# Patient Record
Sex: FEMALE | Race: BLACK OR AFRICAN AMERICAN | NOT HISPANIC OR LATINO | Employment: FULL TIME | URBAN - METROPOLITAN AREA
[De-identification: names, ages, dates, MRNs, and addresses within clinical notes are randomized per-mention and may not be internally consistent; named-entity substitution may affect disease eponyms.]

---

## 2023-11-12 ENCOUNTER — HOSPITAL ENCOUNTER (EMERGENCY)
Facility: HOSPITAL | Age: 53
Discharge: HOME | End: 2023-11-12

## 2023-11-12 ENCOUNTER — APPOINTMENT (OUTPATIENT)
Dept: RADIOLOGY | Facility: HOSPITAL | Age: 53
End: 2023-11-12

## 2023-11-12 VITALS
TEMPERATURE: 98 F | OXYGEN SATURATION: 100 % | DIASTOLIC BLOOD PRESSURE: 95 MMHG | RESPIRATION RATE: 16 BRPM | BODY MASS INDEX: 32.2 KG/M2 | WEIGHT: 175 LBS | HEIGHT: 62 IN | SYSTOLIC BLOOD PRESSURE: 137 MMHG | HEART RATE: 87 BPM

## 2023-11-12 DIAGNOSIS — M84.375A METATARSAL STRESS FRACTURE, LEFT, INITIAL ENCOUNTER: Primary | ICD-10-CM

## 2023-11-12 PROCEDURE — 73630 X-RAY EXAM OF FOOT: CPT | Mod: LT,FY

## 2023-11-12 PROCEDURE — 99285 EMERGENCY DEPT VISIT HI MDM: CPT | Mod: 25

## 2023-11-12 PROCEDURE — 2500000001 HC RX 250 WO HCPCS SELF ADMINISTERED DRUGS (ALT 637 FOR MEDICARE OP): Performed by: NURSE PRACTITIONER

## 2023-11-12 PROCEDURE — 73610 X-RAY EXAM OF ANKLE: CPT | Mod: LT,FY

## 2023-11-12 PROCEDURE — 73610 X-RAY EXAM OF ANKLE: CPT | Mod: LEFT SIDE | Performed by: RADIOLOGY

## 2023-11-12 PROCEDURE — 99284 EMERGENCY DEPT VISIT MOD MDM: CPT | Mod: 25

## 2023-11-12 PROCEDURE — 73630 X-RAY EXAM OF FOOT: CPT | Mod: LEFT SIDE | Performed by: RADIOLOGY

## 2023-11-12 RX ORDER — TIZANIDINE 2 MG/1
4 TABLET ORAL 3 TIMES DAILY PRN
Qty: 180 TABLET | Refills: 0 | Status: SHIPPED | OUTPATIENT
Start: 2023-11-12 | End: 2023-12-12

## 2023-11-12 RX ORDER — NAPROXEN 500 MG/1
500 TABLET ORAL ONCE
Status: COMPLETED | OUTPATIENT
Start: 2023-11-12 | End: 2023-11-12

## 2023-11-12 RX ORDER — NAPROXEN 500 MG/1
500 TABLET ORAL
Qty: 20 TABLET | Refills: 0 | Status: SHIPPED | OUTPATIENT
Start: 2023-11-12 | End: 2023-11-22

## 2023-11-12 RX ADMIN — NAPROXEN 500 MG: 500 TABLET ORAL at 13:25

## 2023-11-12 ASSESSMENT — COLUMBIA-SUICIDE SEVERITY RATING SCALE - C-SSRS
6. HAVE YOU EVER DONE ANYTHING, STARTED TO DO ANYTHING, OR PREPARED TO DO ANYTHING TO END YOUR LIFE?: NO
2. HAVE YOU ACTUALLY HAD ANY THOUGHTS OF KILLING YOURSELF?: NO
1. IN THE PAST MONTH, HAVE YOU WISHED YOU WERE DEAD OR WISHED YOU COULD GO TO SLEEP AND NOT WAKE UP?: NO

## 2023-11-12 ASSESSMENT — PAIN - FUNCTIONAL ASSESSMENT: PAIN_FUNCTIONAL_ASSESSMENT: 0-10

## 2023-11-12 ASSESSMENT — PAIN SCALES - GENERAL: PAINLEVEL_OUTOF10: 8

## 2023-11-12 ASSESSMENT — PAIN DESCRIPTION - ORIENTATION: ORIENTATION: LEFT

## 2023-11-12 ASSESSMENT — PAIN DESCRIPTION - LOCATION: LOCATION: ANKLE

## 2023-11-12 NOTE — ED PROVIDER NOTES
HPI   Chief Complaint   Patient presents with    Ankle Pain       53-year-old female twisted her ankle yesterday evening and she is now experiencing 9 out of 10 lateral ankle pain.  She also endorses swelling.  She denies change in skin temperature or color.  She denies paresthesia.  She denies knee or hip pain.  She denies actually falling.  She denies chest pain or dyspnea.  She denies abdominal pain, nausea or vomiting.  Her only allergy is latex.  She has no medication allergies.  Vital signs were stable in triage.  Pain worsens when she ambulates.      History provided by:  Patient   used: No                        [place] Coma Scale Score: 15                  Patient History   No past medical history on file.  No past surgical history on file.  No family history on file.  Social History     Tobacco Use    Smoking status: Not on file    Smokeless tobacco: Not on file   Substance Use Topics    Alcohol use: Not on file    Drug use: Not on file       Physical Exam   ED Triage Vitals [11/12/23 1318]   Temp Heart Rate Resp BP   36.7 °C (98 °F) 87 16 (!) 137/95      SpO2 Temp src Heart Rate Source Patient Position   100 % -- -- --      BP Location FiO2 (%)     -- --       Physical Exam  Constitutional:       Appearance: Normal appearance.   HENT:      Head: Normocephalic and atraumatic.      Nose: Nose normal.      Mouth/Throat:      Mouth: Mucous membranes are moist.   Cardiovascular:      Rate and Rhythm: Normal rate and regular rhythm.      Pulses: Normal pulses.      Heart sounds: Normal heart sounds.   Pulmonary:      Effort: Pulmonary effort is normal.      Breath sounds: Normal breath sounds.   Abdominal:      General: Abdomen is flat.      Palpations: Abdomen is soft.   Musculoskeletal:         General: Swelling and tenderness present.      Cervical back: Normal range of motion and neck supple.      Comments: Left ankle swelling with tenderness positive [place].  Pedal and posterior tibial  pulse 2/2   Skin:     General: Skin is dry.      Capillary Refill: Capillary refill takes less than 2 seconds.   Neurological:      General: No focal deficit present.      Mental Status: She is alert and oriented to person, place, and time.   Psychiatric:         Mood and Affect: Mood normal.         Behavior: Behavior normal.         ED Course & MDM   Diagnoses as of 11/12/23 1441   Metatarsal stress fracture, left, initial encounter       Medical Decision Making  X-ray ordered of left ankle and he received Naprosyn, Ace wrap, and crutches.  X-ray confirmed 1/5 metatarsal fracture.  Patient was placed in a boot.  I requested appointment with podiatry for follow-up.  She received crutches she understands rest ice compression elevation.  I sent Naprosyn and tizanidine to her pharmacy of choice.  Return precautions reviewed.  Pedal and posterior tibial pulse 2/2.  Safely discharged home.    Amount and/or Complexity of Data Reviewed  Radiology: ordered and independent interpretation performed.     Details: Fifth metatarsal fracture        Procedure  Procedures     Deven Leonard, APRN-CNP  11/12/23 5078

## 2023-11-12 NOTE — ED TRIAGE NOTES
Patient ambulatory to ED with complaint of L ankle pain after tripping in her bedroom yesterday and rolling her ankle. Denies hitting head or use of blood thinners.

## 2024-04-20 ENCOUNTER — HOSPITAL ENCOUNTER (EMERGENCY)
Facility: HOSPITAL | Age: 54
Discharge: HOME | End: 2024-04-20

## 2024-04-20 VITALS
DIASTOLIC BLOOD PRESSURE: 75 MMHG | OXYGEN SATURATION: 98 % | WEIGHT: 175 LBS | BODY MASS INDEX: 32.2 KG/M2 | TEMPERATURE: 97.9 F | RESPIRATION RATE: 16 BRPM | HEART RATE: 72 BPM | HEIGHT: 62 IN | SYSTOLIC BLOOD PRESSURE: 127 MMHG

## 2024-04-20 DIAGNOSIS — K08.89 PAIN, DENTAL: Primary | ICD-10-CM

## 2024-04-20 PROCEDURE — 99283 EMERGENCY DEPT VISIT LOW MDM: CPT

## 2024-04-20 PROCEDURE — 2500000004 HC RX 250 GENERAL PHARMACY W/ HCPCS (ALT 636 FOR OP/ED)

## 2024-04-20 PROCEDURE — 96372 THER/PROPH/DIAG INJ SC/IM: CPT

## 2024-04-20 PROCEDURE — 2500000001 HC RX 250 WO HCPCS SELF ADMINISTERED DRUGS (ALT 637 FOR MEDICARE OP)

## 2024-04-20 RX ORDER — NAPROXEN 500 MG/1
500 TABLET ORAL EVERY 12 HOURS
Qty: 6 TABLET | Refills: 0 | Status: SHIPPED | OUTPATIENT
Start: 2024-04-20 | End: 2024-04-23

## 2024-04-20 RX ORDER — AMOXICILLIN AND CLAVULANATE POTASSIUM 875; 125 MG/1; MG/1
1 TABLET, FILM COATED ORAL EVERY 12 HOURS
Qty: 14 TABLET | Refills: 0 | Status: SHIPPED | OUTPATIENT
Start: 2024-04-20 | End: 2024-04-27

## 2024-04-20 RX ORDER — ACETAMINOPHEN 500 MG
1000 TABLET ORAL EVERY 8 HOURS PRN
Qty: 18 TABLET | Refills: 0 | Status: SHIPPED | OUTPATIENT
Start: 2024-04-20 | End: 2024-04-23

## 2024-04-20 RX ORDER — ACETAMINOPHEN 325 MG/1
975 TABLET ORAL ONCE
Status: COMPLETED | OUTPATIENT
Start: 2024-04-20 | End: 2024-04-20

## 2024-04-20 RX ORDER — KETOROLAC TROMETHAMINE 30 MG/ML
15 INJECTION, SOLUTION INTRAMUSCULAR; INTRAVENOUS ONCE
Status: COMPLETED | OUTPATIENT
Start: 2024-04-20 | End: 2024-04-20

## 2024-04-20 RX ORDER — AMOXICILLIN AND CLAVULANATE POTASSIUM 875; 125 MG/1; MG/1
1 TABLET, FILM COATED ORAL ONCE
Status: COMPLETED | OUTPATIENT
Start: 2024-04-20 | End: 2024-04-20

## 2024-04-20 RX ADMIN — AMOXICILLIN AND CLAVULANATE POTASSIUM 1 TABLET: 875; 125 TABLET, FILM COATED ORAL at 08:47

## 2024-04-20 RX ADMIN — KETOROLAC TROMETHAMINE 15 MG: 30 INJECTION, SOLUTION INTRAMUSCULAR at 08:48

## 2024-04-20 RX ADMIN — ACETAMINOPHEN 975 MG: 325 TABLET ORAL at 08:47

## 2024-04-20 ASSESSMENT — PAIN SCALES - GENERAL
PAINLEVEL_OUTOF10: 10 - WORST POSSIBLE PAIN
PAINLEVEL_OUTOF10: 10 - WORST POSSIBLE PAIN

## 2024-04-20 ASSESSMENT — COLUMBIA-SUICIDE SEVERITY RATING SCALE - C-SSRS
2. HAVE YOU ACTUALLY HAD ANY THOUGHTS OF KILLING YOURSELF?: NO
1. IN THE PAST MONTH, HAVE YOU WISHED YOU WERE DEAD OR WISHED YOU COULD GO TO SLEEP AND NOT WAKE UP?: NO
6. HAVE YOU EVER DONE ANYTHING, STARTED TO DO ANYTHING, OR PREPARED TO DO ANYTHING TO END YOUR LIFE?: NO

## 2024-04-20 ASSESSMENT — PAIN DESCRIPTION - LOCATION
LOCATION: OTHER (COMMENT)
LOCATION: OTHER (COMMENT)

## 2024-04-20 ASSESSMENT — PAIN - FUNCTIONAL ASSESSMENT: PAIN_FUNCTIONAL_ASSESSMENT: 0-10

## 2024-04-20 NOTE — Clinical Note
Andrey Louis was seen and treated in our emergency department on 4/20/2024.  She may return to work on 04/21/2024.       If you have any questions or concerns, please don't hesitate to call.      Ravin Turner PA-C

## 2024-04-20 NOTE — ED PROVIDER NOTES
HPI   Chief Complaint   Patient presents with    Dental Pain       54-year-old female with no significant past medical history presents the ED today with a chief concern of dental pain.  Patient states symptoms have been present for 1 week now.  She states she does have an appoint with her dentist in 4 days.  She states that she did get a dental infection last year, but states that she was given penicillin and it resolved.  She says that she was told that she needed to get her tooth fixed, but she lost her job so she has not been able to get that done since.  She states that she has been taking Motrin and Tylenol for her symptoms as needed.  Describes the pain as an aching/throbbing located in the right lower tooth.  She states that the tooth is cracked in that area.  She denies any history of diabetes.  Denies any chest pain or shortness of breath.  Denies any neck, arm, or jaw pain.  Denies any lightheadedness, dizziness, or imbalance.  States that she does just have a little bit of pain around the site.  She denies exacerbating or relieving factors.  Denies fever/chills, nausea/vomiting.  Denies diarrhea or hematochezia.  Denies abdominal pain.  She is currently going through menopause.  Denies any chance of pregnancy.  Has had irregular menstrual cycles for about a year now.  Denies any difficulty swallowing or sore throat.  Denies lip or tongue swelling.  She has no other symptoms or concerns at this time.  She did not take any medicines before she came to the ED today.      History provided by:  Patient   used: No                        No data recorded                   Patient History   No past medical history on file.  No past surgical history on file.  No family history on file.  Social History     Tobacco Use    Smoking status: Not on file    Smokeless tobacco: Not on file   Substance Use Topics    Alcohol use: Not on file    Drug use: Not on file       Physical Exam   ED Triage Vitals  [04/20/24 0810]   Temperature Heart Rate Respirations BP   36.6 °C (97.9 °F) 71 19 127/75      Pulse Ox Temp Source Heart Rate Source Patient Position   98 % Temporal -- --      BP Location FiO2 (%)     Left arm --       Physical Exam  Gen.: Vitals noted no distress afebrile. Normal phonation, no stridor, no trismus. Patient is handling secretions well without any tripod positioning or drooling.  ENT: TMs clear bilaterally, EACs unremarkable. Mastoids nontender. Posterior oropharynx without erythema, exudate, or swelling. Uvula is in the midline and nonedematous. No Reid's Angina.  Diffuse dental caries.  Tooth #30 and 31 are cracked.  No subgingival abscess.  No necrotizing gingivitis 54-year-old female with no significant past medical history presents the ED today with a chief concern of dental pain.  Vital signs reassuring.  Patient overall appears well and is nontoxic-appearing.  She is full range of motion of the neck without any meningismus.  Satting well on room air.  Not hypoxic.  Not tachycardic.  Afebrile.  Neck: Supple. No meningismus through full range of motion. No lymphadenopathy.   Cardiac: Regular rate rhythm no murmur.   Lungs: Good aeration throughout. No adventitious breath sounds.   Abdomen: Soft nontender nonsurgical throughout  Extremities: No peripheral edema. extremities are moist with good skin turgor.  Skin: No rash.   Neuro: No focal neurologic deficits. cranial nerves II-XII grossly intact.  5/5 strength in lower and upper extremities bilaterally.  Sensation intact in upper and lower extremities bilaterally.  Coordination intact.    ED Course & MDM   Diagnoses as of 04/20/24 0836   Pain, dental       Medical Decision Making  54-year-old female with no significant past medical history presents the ED today with a chief concern of dental pain.  Vital signs reassuring.  Patient overall appears well and is nontoxic-appearing.  She has full range of motion of the neck without any  meningismus.  Satting well on room air.  Not hypoxic.  Not tachycardic.  Afebrile.  Patient does not have any significant facial swelling on exam.  No signs of airway compromise.  No swelling of the floor the mouth.  No lymphadenopathy.  There is no Reid's angina.  No systemic signs or symptoms.  She is not immune compromised.  She is not diabetic.  Do not think CT or blood work is indicated at this time.  Will save patient the radiation given only a mild amount of swelling.  She does have an appoint with her dentist coming up.  She was given Toradol, Tylenol, and Augmentin in the ED.  Again full range of motion of the neck without any meningismus.  No signs of meningitis.  No signs of systemic illness.  Again overall appears well.  She is denying any chest pain or shortness of breath.  No lightheadedness, dizziness, or syncope.  I have low suspicion for any cardiac or etiology other than dental causing her pain.  Not concern for any deep space infection at this time.  No trismus.  Will treat outpatient with naproxen, Tylenol, and Augmentin.  Discussed use of these medications.  Discussed possible side effects of these medications.  Discussed impression and findings with patient she feels comfortable returning home.  We discussed very strict return precautions including returning for any new or worsening signs or symptoms.  Patient is in agreement this plan.  She will follow-up with the PCP and dentistry within 3 days.  Again discussed strict return precautions.  Referred to dentistry and PCP.  Discussed with patient that if swelling begins to get worse she should come back to the ED immediately for CT scan    Differential diagnosis: Pulpitis, dental caries, dental abscess, necrotizing gingivitis, Reid's angina, periodontitis    Disposition/treatment  1.  Dental pain    Shared decision-making was used patient feels comfortable returning home     Patient was advised to follow up with recommended provider in 1  day1 for another evaluation and exam. I advised patient/guardian to return or go to closest emergency room immediately if symptoms change, get worse, new symptoms develop prior to follow up. If there is no improvement in symptoms in the next 24 hours they are advised to return for further evaluation and exam. I also explained the plan and treatment course. Patient/guardian is in agreement with plan, treatment course, and follow up and states verbally that they will comply.    Homegoing. I discussed the differential; results and discharge plan with the patient and/or family/friend/caregiver if present.  I emphasized the importance of follow-up with the physician I referred them to in the timeframe recommended.  I explained reasons for the patient to return to the Emergency Department. They agreed that if they feel their condition is worsening or if they have any other concern they should call 911 immediately for further assistance. I gave the patient an opportunity to ask all questions they had and answered all of them accordingly. They understand return precautions and discharge instructions. The patient and/or family/friend/caregiver expressed understanding verbally and that they would comply.        This note has been transcribed using voice recognition and may contain grammatical errors, misplaced words, incorrect words, incorrect phrases or other errors.          Procedure  Procedures     Ravin Turner PA-C  04/20/24 0857

## 2024-04-20 NOTE — DISCHARGE INSTRUCTIONS
Please return to the ED immediately if you have any new or worsening signs or symptoms  Please follow-up with your primary care doctor and dentistry within 3 days  Please take Augmentin tonight at 9 AM.  Please take naproxen tonight at 9 PM.

## 2024-04-20 NOTE — ED TRIAGE NOTES
Pt. States she been having a toothache for a week she is unable to get a dentist appointment until 04/25. States the pain is unbearable.

## 2024-04-23 ENCOUNTER — APPOINTMENT (OUTPATIENT)
Dept: PRIMARY CARE | Facility: HOSPITAL | Age: 54
End: 2024-04-23

## 2024-05-29 ENCOUNTER — HOSPITAL ENCOUNTER (EMERGENCY)
Facility: HOSPITAL | Age: 54
Discharge: HOME | End: 2024-05-29

## 2024-05-29 VITALS
WEIGHT: 175 LBS | DIASTOLIC BLOOD PRESSURE: 74 MMHG | HEIGHT: 63 IN | TEMPERATURE: 97.3 F | RESPIRATION RATE: 16 BRPM | OXYGEN SATURATION: 97 % | SYSTOLIC BLOOD PRESSURE: 164 MMHG | BODY MASS INDEX: 31.01 KG/M2 | HEART RATE: 65 BPM

## 2024-05-29 DIAGNOSIS — H65.193 ACUTE EFFUSION OF BOTH MIDDLE EARS: Primary | ICD-10-CM

## 2024-05-29 DIAGNOSIS — J01.10 ACUTE NON-RECURRENT FRONTAL SINUSITIS: ICD-10-CM

## 2024-05-29 PROCEDURE — 99283 EMERGENCY DEPT VISIT LOW MDM: CPT

## 2024-05-29 RX ORDER — LORATADINE 10 MG/1
10 TABLET ORAL DAILY
Qty: 20 TABLET | Refills: 0 | Status: SHIPPED | OUTPATIENT
Start: 2024-05-29 | End: 2024-06-18

## 2024-05-29 RX ORDER — PSEUDOEPHEDRINE HCL 30 MG
30 TABLET ORAL EVERY 4 HOURS PRN
Qty: 30 TABLET | Refills: 0 | Status: SHIPPED | OUTPATIENT
Start: 2024-05-29 | End: 2024-06-08

## 2024-05-29 RX ORDER — AZITHROMYCIN 250 MG/1
250 TABLET, FILM COATED ORAL DAILY
Qty: 6 TABLET | Refills: 0 | Status: SHIPPED | OUTPATIENT
Start: 2024-05-29 | End: 2024-06-04

## 2024-05-29 RX ORDER — FLUTICASONE PROPIONATE 50 MCG
1 SPRAY, SUSPENSION (ML) NASAL DAILY PRN
Qty: 16 G | Refills: 0 | Status: SHIPPED | OUTPATIENT
Start: 2024-05-29 | End: 2024-06-28

## 2024-05-29 ASSESSMENT — PAIN SCALES - GENERAL: PAINLEVEL_OUTOF10: 5 - MODERATE PAIN

## 2024-05-29 ASSESSMENT — PAIN DESCRIPTION - DESCRIPTORS: DESCRIPTORS: ACHING

## 2024-05-29 ASSESSMENT — PAIN DESCRIPTION - ORIENTATION: ORIENTATION: LEFT

## 2024-05-29 ASSESSMENT — PAIN DESCRIPTION - LOCATION: LOCATION: EAR

## 2024-05-29 ASSESSMENT — PAIN DESCRIPTION - FREQUENCY: FREQUENCY: CONSTANT/CONTINUOUS

## 2024-05-29 ASSESSMENT — PAIN DESCRIPTION - PAIN TYPE: TYPE: ACUTE PAIN

## 2024-05-29 ASSESSMENT — PAIN - FUNCTIONAL ASSESSMENT: PAIN_FUNCTIONAL_ASSESSMENT: 0-10

## 2024-05-30 NOTE — ED PROVIDER NOTES
HPI     CC: Earache (Pt c/o bilateral earache and sinus drainage that started on Sunday. Pt denies fever, n/v/d)     HPI: Andrey Louis is a 54 y.o. female with no past medical history presents with concern for continued nasal congestion and sensation of hearing underwater out of both ears.  Patient reports that this started about Saturday and has continued.  She woke up this morning and suddenly felt that she could not hear, left ear being worse than right ear.  She states that it sounds like Roland Guillory's teachers around her.  She works as a  and is having difficulty hearing when on the phone.  She does believe that this has been allergies as she has not felt sick, had a fever or chills, or any change in her sinus congestion.  She has tried cold medicines and sinus congestion medicine over-the-counter which has not helped.  Has not tried any sinus medications such as Claritin at this time.  She has used Claritin in the past with relief.  Denies chest pain, shortness of breath, fevers, chills, or change in nasal drainage.    ROS: 10-point review of systems was performed and is otherwise negative except as noted in HPI.      Past Medical History: Noncontributory except per HPI     Past Surgical History: Noncontributory except per HPI     Family History: Reviewed and noncontributory     Social History:  Noncontributory except per HPI       Allergies   Allergen Reactions    Latex Unknown     irritation       Home Meds:   Current Outpatient Medications   Medication Instructions    azithromycin (ZITHROMAX Z-YULISSA) 250 mg, oral, Daily, Take 2 tablets by mouth on day 1 and 1 (one) tablet by mouth for the next 4 days.    fluticasone (Flonase) 50 mcg/actuation nasal spray 1 spray, Each Nostril, Daily PRN, Shake gently. Before first use, prime pump. After use, clean tip and replace cap.    loratadine (CLARITIN) 10 mg, oral, Daily    pseudoephedrine (SUDAFED) 30 mg, oral, Every 4 hours PRN    tiZANidine  "(ZANAFLEX) 4 mg, oral, 3 times daily PRN        ED Triage Vitals [05/29/24 1928]   Temperature Heart Rate Respirations BP   36.3 °C (97.3 °F) 65 16 164/74      Pulse Ox Temp Source Heart Rate Source Patient Position   97 % Temporal Monitor --      BP Location FiO2 (%)     -- --         Heart Rate:  [65]   Temperature:  [36.3 °C (97.3 °F)]   Respirations:  [16]   BP: (164)/(74)   Height:  [160 cm (5' 3\")]   Weight:  [79.4 kg (175 lb)]   Pulse Ox:  [97 %]      Physical Exam:  Physical Exam  Vitals and nursing note reviewed.   Constitutional:       General: She is not in acute distress.     Appearance: Normal appearance. She is not ill-appearing.   HENT:      Head: Normocephalic and atraumatic.      Right Ear: External ear normal. No tenderness. A middle ear effusion is present. Tympanic membrane is not erythematous or retracted.      Left Ear: External ear normal. No tenderness. A middle ear effusion is present. Tympanic membrane is not erythematous or retracted.      Nose: Congestion and rhinorrhea present.      Mouth/Throat:      Mouth: Mucous membranes are moist.      Pharynx: Oropharynx is clear. Uvula midline. No pharyngeal swelling or oropharyngeal exudate.      Tonsils: No tonsillar exudate or tonsillar abscesses.   Eyes:      Extraocular Movements: Extraocular movements intact.      Conjunctiva/sclera: Conjunctivae normal.      Pupils: Pupils are equal, round, and reactive to light.   Cardiovascular:      Rate and Rhythm: Normal rate and regular rhythm.      Pulses: Normal pulses.   Pulmonary:      Effort: Pulmonary effort is normal. No respiratory distress.      Breath sounds: Normal breath sounds.   Abdominal:      General: Abdomen is flat.      Palpations: Abdomen is soft.      Tenderness: There is no abdominal tenderness.   Musculoskeletal:         General: Normal range of motion.      Cervical back: Normal range of motion and neck supple.   Skin:     General: Skin is warm and dry.      Capillary Refill: " Capillary refill takes less than 2 seconds.   Neurological:      General: No focal deficit present.      Mental Status: She is alert and oriented to person, place, and time.   Psychiatric:         Mood and Affect: Mood normal.          Diagnostic Results        Labs Reviewed - No data to display      No orders to display                 [place] Coma Scale Score: 15                  Procedure  Procedures    ED Course & MDM   Assessment/Plan:     Medications - No data to display     Diagnoses as of 05/29/24 2053   Acute effusion of both middle ears   Acute non-recurrent frontal sinusitis       Medical Decision Making    nAdrey Louis is a 54 y.o. female with no past medical history presents with concern for continued nasal congestion and sensation of hearing underwater out of both ears.  Patient is nontoxic-appearing and vital signs are normal.  Physical exam is concerning for bilateral middle ear effusions and sinusitis consistent with seasonal allergies/sinusitis.  This has been ongoing since about Saturday for the patient.  Do not feel that she needs testing for COVID or flu at this time given that symptoms her clinical.  We discussed that she should try different medications such as Flonase, Claritin, and Sudafed which were all sent to her pharmacy.  We discussed that if her symptoms do not improve within a few days, she can start the antibiotic that was also sent to her pharmacy.  Right now, the antibiotic would likely not help as much as the other medications, so I did encourage her to try those first.  If she does develop fever, chills, or change in symptoms that are concerning to her, she may return to the emergency department.  Patient agreeable to plan of care and felt comfortable returning home.    Disposition: Home    ED Prescriptions       Medication Sig Dispense Start Date End Date Auth. Provider    azithromycin (Zithromax Z-Neto) 250 mg tablet Take 1 tablet (250 mg) by mouth once daily for 6 days. Take 2  tablets by mouth on day 1 and 1 (one) tablet by mouth for the next 4 days. 6 tablet 5/29/2024 6/4/2024 Diane Quinteros PA-C    fluticasone (Flonase) 50 mcg/actuation nasal spray Administer 1 spray into each nostril once daily as needed for rhinitis. Shake gently. Before first use, prime pump. After use, clean tip and replace cap. 16 g 5/29/2024 6/28/2024 Diane Quinteros PA-C    loratadine (Claritin) 10 mg tablet Take 1 tablet (10 mg) by mouth once daily for 20 days. 20 tablet 5/29/2024 6/18/2024 Diane F ROCAEL Quinteros    pseudoephedrine (Sudafed) 30 mg tablet Take 1 tablet (30 mg) by mouth every 4 hours if needed for congestion for up to 10 days. 30 tablet 5/29/2024 6/8/2024 Diane Quinteros PA-C            Social Determinants Affecting Care: none     Diane Quinteros PA-C    This note was dictated by speech recognition. Minor errors in transcription may be present.     Diane Quinteros PA-C  05/29/24 2053

## 2024-07-10 ENCOUNTER — TELEPHONE (OUTPATIENT)
Dept: ORTHOPEDIC SURGERY | Facility: HOSPITAL | Age: 54
End: 2024-07-10

## 2024-07-10 NOTE — LETTER
To Whom It May Concern:       Andrey Louis has been a patient of Dr. Remi Hadley of [facility] since August 2022. This letter is to verify that they have a medical grade metal implant within their body due to a surgery that Dr. Hadley performed.     If there are any further concerns, please call the office at [phone].       Sincerely,       The Office of Dr. Remi Hadley  [facility]  Department of Orthopaedic Surgery  Divisions of Orthopaedic Trauma and   Adult Reconstruction

## 2024-07-10 NOTE — TELEPHONE ENCOUNTER
Patient is requesting a letter for the airline for travel saying she has plates in her arm.     Requesting to receive it via email at [email or web address]    Letter complete.     Jackelin Grubbs LPN

## 2024-08-13 ENCOUNTER — HOSPITAL ENCOUNTER (EMERGENCY)
Facility: HOSPITAL | Age: 54
Discharge: HOME | End: 2024-08-13

## 2024-08-13 VITALS
RESPIRATION RATE: 16 BRPM | BODY MASS INDEX: 31.89 KG/M2 | HEIGHT: 63 IN | HEART RATE: 66 BPM | SYSTOLIC BLOOD PRESSURE: 168 MMHG | OXYGEN SATURATION: 100 % | WEIGHT: 180 LBS | DIASTOLIC BLOOD PRESSURE: 81 MMHG | TEMPERATURE: 97.8 F

## 2024-08-13 DIAGNOSIS — M62.830 LUMBAR PARASPINAL MUSCLE SPASM: Primary | ICD-10-CM

## 2024-08-13 LAB
APPEARANCE UR: CLEAR
BILIRUB UR STRIP.AUTO-MCNC: NEGATIVE MG/DL
COLOR UR: YELLOW
GLUCOSE UR STRIP.AUTO-MCNC: NORMAL MG/DL
HCG UR QL IA.RAPID: NEGATIVE
KETONES UR STRIP.AUTO-MCNC: NEGATIVE MG/DL
LEUKOCYTE ESTERASE UR QL STRIP.AUTO: NEGATIVE
MUCOUS THREADS #/AREA URNS AUTO: NORMAL /LPF
NITRITE UR QL STRIP.AUTO: NEGATIVE
PH UR STRIP.AUTO: 5.5 [PH]
PROT UR STRIP.AUTO-MCNC: NEGATIVE MG/DL
RBC # UR STRIP.AUTO: ABNORMAL /UL
RBC #/AREA URNS AUTO: NORMAL /HPF
SP GR UR STRIP.AUTO: 1.02
SQUAMOUS #/AREA URNS AUTO: NORMAL /HPF
UROBILINOGEN UR STRIP.AUTO-MCNC: NORMAL MG/DL
WBC #/AREA URNS AUTO: NORMAL /HPF

## 2024-08-13 PROCEDURE — 81025 URINE PREGNANCY TEST: CPT | Performed by: PHYSICIAN ASSISTANT

## 2024-08-13 PROCEDURE — 81001 URINALYSIS AUTO W/SCOPE: CPT | Performed by: PHYSICIAN ASSISTANT

## 2024-08-13 PROCEDURE — 99283 EMERGENCY DEPT VISIT LOW MDM: CPT

## 2024-08-13 PROCEDURE — 2500000004 HC RX 250 GENERAL PHARMACY W/ HCPCS (ALT 636 FOR OP/ED): Performed by: PHYSICIAN ASSISTANT

## 2024-08-13 PROCEDURE — 96372 THER/PROPH/DIAG INJ SC/IM: CPT | Performed by: PHYSICIAN ASSISTANT

## 2024-08-13 RX ORDER — KETOROLAC TROMETHAMINE 30 MG/ML
60 INJECTION, SOLUTION INTRAMUSCULAR; INTRAVENOUS ONCE
Status: COMPLETED | OUTPATIENT
Start: 2024-08-13 | End: 2024-08-13

## 2024-08-13 RX ORDER — METHOCARBAMOL 500 MG/1
500 TABLET, FILM COATED ORAL EVERY 12 HOURS PRN
Qty: 20 TABLET | Refills: 0 | Status: SHIPPED | OUTPATIENT
Start: 2024-08-13

## 2024-08-13 RX ORDER — DIAZEPAM 5 MG/ML
5 INJECTION, SOLUTION INTRAMUSCULAR; INTRAVENOUS ONCE
Status: COMPLETED | OUTPATIENT
Start: 2024-08-13 | End: 2024-08-13

## 2024-08-13 ASSESSMENT — PAIN DESCRIPTION - LOCATION: LOCATION: BACK

## 2024-08-13 ASSESSMENT — PAIN SCALES - GENERAL
PAINLEVEL_OUTOF10: 8
PAINLEVEL_OUTOF10: 6

## 2024-08-13 ASSESSMENT — PAIN DESCRIPTION - ORIENTATION: ORIENTATION: LOWER;RIGHT

## 2024-08-13 ASSESSMENT — PAIN - FUNCTIONAL ASSESSMENT
PAIN_FUNCTIONAL_ASSESSMENT: 0-10
PAIN_FUNCTIONAL_ASSESSMENT: 0-10

## 2024-08-13 NOTE — ED TRIAGE NOTES
PT is A/Ox4 coming in for lower right sided back pain that started Sunday. PT stated that she woke up with a spasm in the back and tried over the counter medication without relief.

## 2024-08-13 NOTE — Clinical Note
Andrey Louis was seen and treated in our emergency department on 8/13/2024.  She may return to work on 08/14/2024.       If you have any questions or concerns, please don't hesitate to call.      Manuel Baca PA-C

## 2024-08-13 NOTE — ED PROVIDER NOTES
HPI   Chief Complaint   Patient presents with    Back Pain       History of present illness: 54-year-old female complains of right lower back pain that began 3 days ago.  Patient states she was out with friends the night before and woke up with stiffness and discomfort in the right lower back.  Some of that shoots into the right buttocks.  It is disc comfortable to sit or lie down.  Feels better with standing.  Denies paresthesias incontinence abdominal pain nausea vomiting fevers chills sweats lightheadedness dizziness abdominal pain chest pain shortness of breath.  Tried Tylenol ibuprofen hot cold limited improvement.    Review of systems: Constitutional, eye, ENT, cardiovascular, respiratory, gastrointestinal, genitourinary, neurologic, musculoskeletal, dermatologic, hematologic, endocrine systems were evaluated and were negative unless otherwise specified in history of present illness.    Medications: Reviewed and per nursing note.    Family history: Denies relevant medical conditions.    Past medical history: None per patient    Social history: Denies tobacco, alcohol, drug use.    Physical exam:    Appearance: Well-developed, well-nourished, nontoxic-appearing, alert and oriented x3. Talking in complete sentences.    HEENT:  Head normocephalic atraumatic, extraocular movements intact, pupils equal round reactive to light, mucous membranes are moist and pink.    NECK:  Nml Inspection, no meningismus, no thyromegaly, no lymphadenopathy, no JVD, trachea is midline.    Respiratory: Clear to auscultation bilaterally with normal bilateral excursion. No wheezes, rhonchi, crackles.    Cardiovascular: Regular rate and rhythm, no murmurs, rubs or gallops. Pulses 2+ symmetrically in the dorsalis pedis and radial pulses.    Abdomen/GI:  Soft, nontender, nondistended, normal bowel sounds x4. No masses or organomegaly.    : Right CVA tenderness that extends to the lumbar region    Neuro:  Oriented x 3, Speech Clear,  cranial nerves grossly intact. Normal sensation to light touch in all 4 extremities.    Musculoskeletal: Right paralumbar tenderness and spasm with no vertebral point tenderness.  Patient spontaneously moves all 4 extremities.    Skin:  No cyanosis, clubbing, edema, open wounds, or rashes.                Patient History   History reviewed. No pertinent past medical history.  History reviewed. No pertinent surgical history.  No family history on file.  Social History     Tobacco Use    Smoking status: Not on file    Smokeless tobacco: Not on file   Substance Use Topics    Alcohol use: Not on file    Drug use: Not on file       Physical Exam   ED Triage Vitals [08/13/24 1050]   Temperature Heart Rate Respirations BP   36.6 °C (97.8 °F) 66 16 168/81      Pulse Ox Temp src Heart Rate Source Patient Position   100 % -- -- --      BP Location FiO2 (%)     -- --       Physical Exam      ED Course & MDM   Diagnoses as of 08/13/24 1203   Lumbar paraspinal muscle spasm                 No data recorded     Imelda Coma Scale Score: 15 (08/13/24 1131 : Carmen Castillo LPN)                           Medical Decision Making  Labs Reviewed  URINALYSIS WITH REFLEX CULTURE AND MICROSCOPIC - Abnormal     Color, Urine                  Yellow                 Appearance, Urine             Clear                  Specific Gravity, Urine       1.022                  pH, Urine                     5.5                    Protein, Urine                NEGATIVE                Glucose, Urine                Normal                 Blood, Urine                    (*)                  Ketones, Urine                NEGATIVE                Bilirubin, Urine              NEGATIVE                Urobilinogen, Urine           Normal                 Nitrite, Urine                NEGATIVE                Leukocyte Esterase, Urine     NEGATIVE             HCG, URINE, QUALITATIVE - Normal     HCG, Urine                    NEGATIVE             URINALYSIS WITH  REFLEX CULTURE AND MICROSCOPIC         Narrative: The following orders were created for panel order Urinalysis with Reflex Culture and Microscopic.                  Procedure                               Abnormality         Status                                     ---------                               -----------         ------                                     Urinalysis with Reflex C...[[id number]]  Abnormal            Final result                               Extra Urine Gray Tube[[id number]]                                                                                         Please view results for these tests on the individual orders.  EXTRA URINE GRAY TUBE  URINALYSIS MICROSCOPIC WITH REFLEX CULTURE      54-year-old female with pain right lower back.  Differential diagnosis of fracture dislocation sprain strain pyelonephritis kidney stone muscle spasm sciatica herniated disc spinal stenosis cauda equina epidural abscess.  Examination shows tenderness and spasm right paralumbar region including the right CVA region.  No urinary symptoms.  No vertebral point tenderness.    Urinalysis hCG ordered.  The studies Show trace blood in the macroscopic but no microhematuria.  Unlikely kidney stone.  Patient had improvement with Valium and Toradol IM.  Presentation most consistent with a muscle spasm.  Patient is given prescription for muscle relaxer.    Patient will be discharged to home with prescription.  Patient is educated in signs and symptoms of worsening symptoms and reasons to come back to the emergency department.  Will need to follow up with primary care provider.  Patient does not report social determinants of health impacting ability to obtain care that is needed.  Patient agrees with plan.    This is a transcription.  Text was reviewed for errors, but some transcription errors may remain.  Please call for any questions.          Procedure  Procedures     Manuel Baca PA-C  08/13/24 [id number]

## 2024-11-18 ENCOUNTER — HOSPITAL ENCOUNTER (EMERGENCY)
Facility: HOSPITAL | Age: 54
Discharge: HOME | End: 2024-11-18

## 2024-11-18 VITALS
TEMPERATURE: 96.8 F | RESPIRATION RATE: 18 BRPM | BODY MASS INDEX: 30.83 KG/M2 | WEIGHT: 174 LBS | HEIGHT: 63 IN | SYSTOLIC BLOOD PRESSURE: 174 MMHG | OXYGEN SATURATION: 99 % | HEART RATE: 74 BPM | DIASTOLIC BLOOD PRESSURE: 70 MMHG

## 2024-11-18 DIAGNOSIS — K04.7 DENTAL INFECTION: Primary | ICD-10-CM

## 2024-11-18 PROCEDURE — 2500000002 HC RX 250 W HCPCS SELF ADMINISTERED DRUGS (ALT 637 FOR MEDICARE OP, ALT 636 FOR OP/ED): Performed by: NURSE PRACTITIONER

## 2024-11-18 PROCEDURE — 2500000001 HC RX 250 WO HCPCS SELF ADMINISTERED DRUGS (ALT 637 FOR MEDICARE OP): Performed by: NURSE PRACTITIONER

## 2024-11-18 PROCEDURE — 99283 EMERGENCY DEPT VISIT LOW MDM: CPT

## 2024-11-18 PROCEDURE — 2500000005 HC RX 250 GENERAL PHARMACY W/O HCPCS: Performed by: NURSE PRACTITIONER

## 2024-11-18 RX ORDER — PENICILLIN V POTASSIUM 250 MG/1
500 TABLET, FILM COATED ORAL ONCE
Status: COMPLETED | OUTPATIENT
Start: 2024-11-18 | End: 2024-11-18

## 2024-11-18 RX ORDER — PENICILLIN V POTASSIUM 500 MG/1
500 TABLET, FILM COATED ORAL 4 TIMES DAILY
Qty: 28 TABLET | Refills: 0 | Status: SHIPPED | OUTPATIENT
Start: 2024-11-18 | End: 2024-11-25

## 2024-11-18 RX ORDER — NAPROXEN 500 MG/1
500 TABLET ORAL
Qty: 20 TABLET | Refills: 0 | Status: SHIPPED | OUTPATIENT
Start: 2024-11-18 | End: 2024-11-28

## 2024-11-18 RX ORDER — NAPROXEN 500 MG/1
500 TABLET ORAL ONCE
Status: COMPLETED | OUTPATIENT
Start: 2024-11-18 | End: 2024-11-18

## 2024-11-18 ASSESSMENT — PAIN DESCRIPTION - PROGRESSION: CLINICAL_PROGRESSION: NOT CHANGED

## 2024-11-18 ASSESSMENT — PAIN DESCRIPTION - LOCATION: LOCATION: TEETH

## 2024-11-18 ASSESSMENT — PAIN DESCRIPTION - DESCRIPTORS: DESCRIPTORS: SORE

## 2024-11-18 ASSESSMENT — PAIN - FUNCTIONAL ASSESSMENT: PAIN_FUNCTIONAL_ASSESSMENT: 0-10

## 2024-11-18 ASSESSMENT — PAIN DESCRIPTION - FREQUENCY: FREQUENCY: INTERMITTENT

## 2024-11-18 ASSESSMENT — PAIN DESCRIPTION - ONSET: ONSET: PROGRESSIVE

## 2024-11-18 ASSESSMENT — PAIN DESCRIPTION - PAIN TYPE: TYPE: ACUTE PAIN

## 2024-11-18 ASSESSMENT — PAIN SCALES - GENERAL: PAINLEVEL_OUTOF10: 9

## 2024-11-18 ASSESSMENT — PAIN DESCRIPTION - ORIENTATION: ORIENTATION: RIGHT

## 2024-11-18 NOTE — ED TRIAGE NOTES
Pt states that she has been dealing with tooth pain on the right side for a couple months now. Pt states that she is unable to get into the dentist's office until mid December. Pt has swelling on the right side of her face, pain rated 9/10.

## 2024-11-18 NOTE — ED PROVIDER NOTES
HPI   Chief Complaint   Patient presents with    Dental Pain    Oral Swelling       54-year-old female presents today with dental infection and swelling of the right jaw.  She had this exact same symptoms occur a few months ago and she came to our emergency department and the infection went down.  She has a dental appointment on December 17 which is the soonest she could get in.  She rates her pain 8 out of 10.  She denies dyspnea.  She denies fever or chills.  She denies chest pain.  She denies abdominal pain, nausea, or vomiting.  She is postmenopausal and all vital signs are stable in triage.      History provided by:  Patient   used: No            Patient History   No past medical history on file.  No past surgical history on file.  No family history on file.  Social History     Tobacco Use    Smoking status: Not on file    Smokeless tobacco: Not on file   Substance Use Topics    Alcohol use: Not on file    Drug use: Not on file       Physical Exam   ED Triage Vitals [11/18/24 1006]   Temperature Heart Rate Respirations BP   36 °C (96.8 °F) 74 18 174/70      Pulse Ox Temp Source Heart Rate Source Patient Position   99 % Temporal Monitor Sitting      BP Location FiO2 (%)     Left arm --       Physical Exam  Constitutional:       Appearance: Normal appearance.   HENT:      Head: Normocephalic and atraumatic.      Nose: Nose normal.      Mouth/Throat:      Mouth: Mucous membranes are dry.      Comments: Swelling to the right jaw and her molar #2 and #3 are fractured and decayed along with signs of infection  Cardiovascular:      Rate and Rhythm: Normal rate and regular rhythm.      Pulses: Normal pulses.      Heart sounds: Normal heart sounds.   Pulmonary:      Effort: Pulmonary effort is normal.      Breath sounds: Normal breath sounds.   Abdominal:      General: Abdomen is flat.   Musculoskeletal:      Cervical back: Normal range of motion and neck supple.   Skin:     General: Skin is warm.       Capillary Refill: Capillary refill takes less than 2 seconds.   Neurological:      General: No focal deficit present.      Mental Status: She is alert and oriented to person, place, and time.   Psychiatric:         Mood and Affect: Mood normal.         Behavior: Behavior normal.           ED Course & MDM   Diagnoses as of 11/18/24 1023   Dental infection                 No data recorded     Imelda Coma Scale Score: 15 (11/18/24 1016 : Jonathan Person RN)                           Medical Decision Making  Diagnosed with dental abscess and dental infection.  She already has an appointment on December 17 she was started on penicillin and Cetacaine along with Naprosyn and safely discharged home with careful return precautions        Procedure  Procedures     Deven Leonard, APRN-CNP  11/18/24 1023

## 2024-11-18 NOTE — LETTER
November 18, 2024    Patient: Andrey Louis   YOB: 1970   Date of Visit: 11/18/2024       To Whom It May Concern:    Andrey Louis was seen and treated in our emergency department on 11/18/2024. She may return to work on 11/19/20024.    If you have any questions or concerns, please don't hesitate to call.